# Patient Record
Sex: MALE | Race: BLACK OR AFRICAN AMERICAN | Employment: FULL TIME | URBAN - METROPOLITAN AREA
[De-identification: names, ages, dates, MRNs, and addresses within clinical notes are randomized per-mention and may not be internally consistent; named-entity substitution may affect disease eponyms.]

---

## 2021-05-28 ENCOUNTER — RECORDS - HEALTHEAST (OUTPATIENT)
Dept: ADMINISTRATIVE | Facility: CLINIC | Age: 61
End: 2021-05-28

## 2021-10-28 ENCOUNTER — OFFICE VISIT (OUTPATIENT)
Dept: FAMILY MEDICINE | Facility: CLINIC | Age: 61
End: 2021-10-28

## 2021-10-28 VITALS
DIASTOLIC BLOOD PRESSURE: 103 MMHG | OXYGEN SATURATION: 98 % | BODY MASS INDEX: 23.98 KG/M2 | RESPIRATION RATE: 18 BRPM | WEIGHT: 149.2 LBS | SYSTOLIC BLOOD PRESSURE: 148 MMHG | HEART RATE: 88 BPM | HEIGHT: 66 IN

## 2021-10-28 DIAGNOSIS — Z98.1 S/P SPINAL FUSION: ICD-10-CM

## 2021-10-28 DIAGNOSIS — K21.00 GASTROESOPHAGEAL REFLUX DISEASE WITH ESOPHAGITIS WITHOUT HEMORRHAGE: ICD-10-CM

## 2021-10-28 DIAGNOSIS — M54.16 LUMBAR RADICULOPATHY: Primary | ICD-10-CM

## 2021-10-28 DIAGNOSIS — Z12.11 SCREENING FOR COLON CANCER: ICD-10-CM

## 2021-10-28 PROCEDURE — 99204 OFFICE O/P NEW MOD 45 MIN: CPT | Performed by: FAMILY MEDICINE

## 2021-10-28 RX ORDER — AMITRIPTYLINE HYDROCHLORIDE 50 MG/1
50 TABLET ORAL AT BEDTIME
COMMUNITY
End: 2021-10-28

## 2021-10-28 RX ORDER — AMITRIPTYLINE HYDROCHLORIDE 50 MG/1
50 TABLET ORAL AT BEDTIME
Qty: 90 TABLET | Refills: 1 | Status: SHIPPED | OUTPATIENT
Start: 2021-10-28 | End: 2021-11-12

## 2021-10-28 RX ORDER — NICOTINE POLACRILEX 4 MG/1
20 GUM, CHEWING ORAL
Qty: 270 TABLET | Refills: 1 | Status: SHIPPED | OUTPATIENT
Start: 2021-10-28 | End: 2021-11-12

## 2021-10-28 ASSESSMENT — ANXIETY QUESTIONNAIRES
5. BEING SO RESTLESS THAT IT IS HARD TO SIT STILL: SEVERAL DAYS
7. FEELING AFRAID AS IF SOMETHING AWFUL MIGHT HAPPEN: NOT AT ALL
1. FEELING NERVOUS, ANXIOUS, OR ON EDGE: SEVERAL DAYS
2. NOT BEING ABLE TO STOP OR CONTROL WORRYING: MORE THAN HALF THE DAYS
6. BECOMING EASILY ANNOYED OR IRRITABLE: SEVERAL DAYS
3. WORRYING TOO MUCH ABOUT DIFFERENT THINGS: NEARLY EVERY DAY
GAD7 TOTAL SCORE: 10
IF YOU CHECKED OFF ANY PROBLEMS ON THIS QUESTIONNAIRE, HOW DIFFICULT HAVE THESE PROBLEMS MADE IT FOR YOU TO DO YOUR WORK, TAKE CARE OF THINGS AT HOME, OR GET ALONG WITH OTHER PEOPLE: SOMEWHAT DIFFICULT

## 2021-10-28 ASSESSMENT — PATIENT HEALTH QUESTIONNAIRE - PHQ9
5. POOR APPETITE OR OVEREATING: MORE THAN HALF THE DAYS
SUM OF ALL RESPONSES TO PHQ QUESTIONS 1-9: 12

## 2021-10-28 ASSESSMENT — MIFFLIN-ST. JEOR: SCORE: 1429.52
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
## 2021-10-29 ASSESSMENT — ANXIETY QUESTIONNAIRES: GAD7 TOTAL SCORE: 10

## 2021-11-12 DIAGNOSIS — M54.16 LUMBAR RADICULOPATHY: ICD-10-CM

## 2021-11-12 DIAGNOSIS — K21.00 GASTROESOPHAGEAL REFLUX DISEASE WITH ESOPHAGITIS WITHOUT HEMORRHAGE: ICD-10-CM

## 2021-11-12 DIAGNOSIS — Z98.1 S/P SPINAL FUSION: ICD-10-CM

## 2021-11-12 RX ORDER — NICOTINE POLACRILEX 4 MG/1
20 GUM, CHEWING ORAL
Qty: 270 TABLET | Refills: 1 | Status: SHIPPED | OUTPATIENT
Start: 2021-11-12 | End: 2022-09-12

## 2021-11-12 RX ORDER — AMITRIPTYLINE HYDROCHLORIDE 50 MG/1
50 TABLET ORAL AT BEDTIME
Qty: 90 TABLET | Refills: 1 | Status: SHIPPED | OUTPATIENT
Start: 2021-11-12 | End: 2022-04-10
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
## 2022-01-08 ENCOUNTER — APPOINTMENT (OUTPATIENT)
Dept: RADIOLOGY | Facility: CLINIC | Age: 62
End: 2022-01-08
Attending: EMERGENCY MEDICINE

## 2022-01-08 ENCOUNTER — HOSPITAL ENCOUNTER (EMERGENCY)
Facility: CLINIC | Age: 62
Discharge: HOME | End: 2022-01-08
Attending: EMERGENCY MEDICINE | Admitting: EMERGENCY MEDICINE
Payer: COMMERCIAL

## 2022-01-08 VITALS
WEIGHT: 142 LBS | HEART RATE: 70 BPM | BODY MASS INDEX: 22.92 KG/M2 | RESPIRATION RATE: 18 BRPM | OXYGEN SATURATION: 98 % | SYSTOLIC BLOOD PRESSURE: 186 MMHG | DIASTOLIC BLOOD PRESSURE: 107 MMHG | TEMPERATURE: 97.6 F

## 2022-01-08 DIAGNOSIS — S46.912A STRAIN OF LEFT SHOULDER, INITIAL ENCOUNTER: ICD-10-CM
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
## 2022-01-08 LAB
ATRIAL RATE - MUSE: 71 BPM
DIASTOLIC BLOOD PRESSURE - MUSE: NORMAL MMHG
INTERPRETATION ECG - MUSE: NORMAL
P AXIS - MUSE: 77 DEGREES
PR INTERVAL - MUSE: 130 MS
QRS DURATION - MUSE: 90 MS
QT - MUSE: 412 MS
QTC - MUSE: 447 MS
R AXIS - MUSE: 92 DEGREES
SYSTOLIC BLOOD PRESSURE - MUSE: NORMAL MMHG
T AXIS - MUSE: 68 DEGREES
VENTRICULAR RATE- MUSE: 71 BPM

## 2022-01-08 PROCEDURE — 73030 X-RAY EXAM OF SHOULDER: CPT | Mod: LT

## 2022-01-08 PROCEDURE — 99284 EMERGENCY DEPT VISIT MOD MDM: CPT

## 2022-01-08 PROCEDURE — 93005 ELECTROCARDIOGRAM TRACING: CPT | Performed by: EMERGENCY MEDICINE

## 2022-01-08 RX ORDER — CYCLOBENZAPRINE HCL 10 MG
10 TABLET ORAL 3 TIMES DAILY PRN
Qty: 20 TABLET | Refills: 0 | Status: SHIPPED | OUTPATIENT
Start: 2022-01-08 | End: 2022-01-14

## 2022-01-08 ASSESSMENT — ENCOUNTER SYMPTOMS: MYALGIAS: 1
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
## 2022-03-14 ENCOUNTER — PATIENT OUTREACH (OUTPATIENT)
Dept: CARE COORDINATION | Facility: CLINIC | Age: 62
End: 2022-03-14
Payer: COMMERCIAL

## 2022-03-14 NOTE — LETTER
[facility] CARE COORDINATION      [facility]   [address] [place]  [address]    March 15, 2022    Luis Meng  [address]  [place] [address]    Dear Luis,    We have been trying to reach you to introduce you to [facility] s Care Coordination program.  The goal of care coordination is to help you manage your health and improve access to the [facility] system in the most efficient manner.  The Care Coordinator is a /nurse who understands the healthcare system and will assist you in improving your access to care.     As your Physician and Care Coordinator we partner to help you achieve your health care goals.     We will continue to reach out; however, if you are able to call your Care Coordinator at [facility], [phone], that would be appreciated.  We at [facility] are focused on providing you with the highest-quality healthcare experience possible.      It is a pleasure to partner with you as we work towards achieving your optimal state of wellness.            Sincerely,        Latrell Lake, SW/CC, for [facility], SW/CC

## 2022-03-14 NOTE — PROGRESS NOTES
Clinic Care Coordination Contact - 3-15-22  [facility]/Voicemail    Clinical Data: SW/Care Coordinator Outreach  Outreach attempted x 2.    Plan: SW/Care Coordinator was unable to leave voice mail message as pt's voice mail was not yet set up.  SW/CC will send unable to contact letter with care coordinator contact information via mail. Care Coordinator will try to reach patient again in 3-5 business days.    DAVID Aponte/DOMINICK, for [facility], DAVID/CC  [phone]      Clinic Care Coordination Contact - 3-14-22  [facility]/Voicemail    Clinical Data: SW/Care Coordinator Outreach  Outreach attempted x 1.  SW/CC was unable to leave voice mail message for pt as his voice mail box was not set up.  Later this morning, SW/CC reached out to pt x2,  though he was on another line talking to his son from out of town and preferred to talk with SW/CC tomorrow morning.      Plan:  SW/CC will reach out to pt again tomorrow AM..    AKILAH Aponte, for DAVID Meng/CC  [phone]

## 2022-03-18 ENCOUNTER — PATIENT OUTREACH (OUTPATIENT)
Dept: NURSING | Facility: CLINIC | Age: 62
End: 2022-03-18
Payer: COMMERCIAL
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
## 2022-04-10 DIAGNOSIS — Z98.1 S/P SPINAL FUSION: ICD-10-CM

## 2022-04-10 DIAGNOSIS — M54.16 LUMBAR RADICULOPATHY: ICD-10-CM

## 2022-04-10 RX ORDER — AMITRIPTYLINE HYDROCHLORIDE 50 MG/1
TABLET ORAL
Qty: 90 TABLET | Refills: 1 | Status: SHIPPED | OUTPATIENT
Start: 2022-04-10 | End: 2022-09-05

## 2022-09-05 DIAGNOSIS — Z98.1 S/P SPINAL FUSION: ICD-10-CM

## 2022-09-05 DIAGNOSIS — M54.16 LUMBAR RADICULOPATHY: ICD-10-CM

## 2022-09-05 RX ORDER — AMITRIPTYLINE HYDROCHLORIDE 50 MG/1
TABLET ORAL
Qty: 90 TABLET | Refills: 1 | Status: SHIPPED | OUTPATIENT
Start: 2022-09-05 | End: 2023-06-26

## 2022-09-12 DIAGNOSIS — K21.00 GASTROESOPHAGEAL REFLUX DISEASE WITH ESOPHAGITIS WITHOUT HEMORRHAGE: ICD-10-CM

## 2022-09-12 RX ORDER — NICOTINE POLACRILEX 4 MG/1
20 GUM, CHEWING ORAL
Qty: 270 TABLET | Refills: 1 | Status: SHIPPED | OUTPATIENT
Start: 2022-09-12 | End: 2022-09-15

## 2022-09-12 NOTE — TELEPHONE ENCOUNTER
OMEPRAZOLE     LOV: 10/28/21    Gastroesophageal reflux disease with esophagitis without hemorrhage  -     omeprazole 20 MG tablet; Take 1 tablet (20 mg) by mouth 3 times daily Take 1 tablet before breakfast, 1 tablet before lunch, 1 tablet before dinnerTake 1 tablet before breakfast, 1 tablet before lunch, 1 tablet before dinner

## 2022-09-15 ENCOUNTER — TELEPHONE (OUTPATIENT)
Dept: FAMILY MEDICINE | Facility: CLINIC | Age: 62
End: 2022-09-15

## 2022-09-15 DIAGNOSIS — K21.00 GASTROESOPHAGEAL REFLUX DISEASE WITH ESOPHAGITIS WITHOUT HEMORRHAGE: Primary | ICD-10-CM

## 2022-09-15 NOTE — TELEPHONE ENCOUNTER
Received notice that omeprazole 20mg TID before meals requires prior authorization.   Tabs not covered as considered OTC; capsules preferred but need prior authorization.   Called patient to get details on 9/12/22 prior to sending rx. Patient reported had been off this med for many months and is miserable now with abd pain. Requested rx be sent asap.   Prior authorization request submitted today via Cover My Meds and was approved effective 1/1/22-12/31/22.     Last visit: 10/28/21 saw Dr. Zuniga to establish care at clinic  Advised patient due for appt.     Pharmacy informed prior authorization approved. New rx sent for omeprazole DR Capsules 20mg TID before meals #270 with no refills. Patient needs appt before next fill.   Lo Abdalla RN

## 2022-11-06 ENCOUNTER — HOSPITAL ENCOUNTER (EMERGENCY)
Facility: HOSPITAL | Age: 62
Discharge: HOME | End: 2022-11-06
Admitting: NURSE PRACTITIONER

## 2022-11-06 ENCOUNTER — APPOINTMENT (OUTPATIENT)
Dept: RADIOLOGY | Facility: HOSPITAL | Age: 62
End: 2022-11-06
Attending: NURSE PRACTITIONER

## 2022-11-06 ENCOUNTER — NURSE TRIAGE (OUTPATIENT)
Dept: NURSING | Facility: CLINIC | Age: 62
End: 2022-11-06

## 2022-11-06 VITALS
OXYGEN SATURATION: 98 % | HEART RATE: 100 BPM | RESPIRATION RATE: 16 BRPM | HEIGHT: 66 IN | SYSTOLIC BLOOD PRESSURE: 182 MMHG | WEIGHT: 137 LBS | TEMPERATURE: 98 F | BODY MASS INDEX: 22.02 KG/M2 | DIASTOLIC BLOOD PRESSURE: 93 MMHG

## 2022-11-06 DIAGNOSIS — M79.644 PAIN OF FINGER OF RIGHT HAND: ICD-10-CM

## 2022-11-06 PROCEDURE — 250N000013 HC RX MED GY IP 250 OP 250 PS 637: Performed by: NURSE PRACTITIONER

## 2022-11-06 PROCEDURE — 99283 EMERGENCY DEPT VISIT LOW MDM: CPT

## 2022-11-06 PROCEDURE — 73130 X-RAY EXAM OF HAND: CPT | Mod: RT

## 2022-11-06 RX ORDER — OXYCODONE AND ACETAMINOPHEN 5; 325 MG/1; MG/1
1 TABLET ORAL ONCE
Status: COMPLETED | OUTPATIENT
Start: 2022-11-06 | End: 2022-11-06

## 2022-11-06 RX ORDER — OXYCODONE HYDROCHLORIDE 5 MG/1
5 TABLET ORAL EVERY 6 HOURS PRN
Qty: 5 TABLET | Refills: 0 | Status: SHIPPED | OUTPATIENT
Start: 2022-11-06 | End: 2022-11-09

## 2022-11-06 RX ADMIN — OXYCODONE HYDROCHLORIDE AND ACETAMINOPHEN 1 TABLET: 5; 325 TABLET ORAL at 14:13

## 2022-11-06 ASSESSMENT — ENCOUNTER SYMPTOMS
NUMBNESS: 0
ARTHRALGIAS: 1
CHILLS: 0
WEAKNESS: 0
FEVER: 0
BACK PAIN: 1

## 2022-11-06 NOTE — ED PROVIDER NOTES
EMERGENCY DEPARTMENT ENCOUNTER      NAME: Luis Meng  AGE: 62 year old male  YOB: 1960  MRN: [id number]  EVALUATION DATE & TIME: No admission date for patient encounter.    PCP: Donnell Zuniga    ED PROVIDER: TOMAS Huynh CNP      Chief Complaint   Patient presents with     Hand Pain         FINAL IMPRESSION:  1. Pain of finger of right hand          ED COURSE & MEDICAL DECISION MAKIN:35 PM I met with the patient, obtained history, performed an initial exam, and discussed options and plan for treatment here in the ED.    Pertinent Labs & Imaging studies reviewed. (See chart for details)  62 year old male presents to the Emergency Department for evaluation of right hand pain. Pain on the tips of the 1-3 digits. Does not have swelling or erythema. No evidence for pulp space infection. No evidence for ischemic process. No underlying fracture or significant DJD. Suspect his pain may be related to repetitive trauma due to work. Does have a fissure on the tip of the thumb we discussed moisturizing. Should follow up in clinic in one week. Provided with instructions regarding pain management and was given a prescription for 5 oxycodone tablets since he did appear quite uncomfortable. Also given return precautions.     At the conclusion of the encounter I discussed the results of all of the tests and the disposition. The questions were answered. The patient or family acknowledged understanding and was agreeable with the care plan.       MEDICATIONS GIVEN IN THE EMERGENCY:  Medications   oxyCODONE-acetaminophen (PERCOCET) 5-325 MG per tablet 1 tablet (1 tablet Oral Given 22 1413)       NEW PRESCRIPTIONS STARTED AT TODAY'S ER VISIT  New Prescriptions    OXYCODONE (ROXICODONE) 5 MG TABLET    Take 1 tablet (5 mg) by mouth every 6 hours as needed for severe pain            =================================================================    HPI    Patient information was obtained  from: patient    Use of Intrepreter: N/A        Luis Meng is a 62 year old right handed male who  has a past medical history of Cervical spondylosis without myelopathy (4/1/2009), Erectile dysfunction (10/10/2008), and S/P spinal fusion (8/10/2013) and presents today for evaluation of right hand pain. Started about 1 week ago. Cannot recall any particular injury. Has pain involving the finger tips on the right 1-3 digits. Mild pain at rest. Worse with movement or palpation. Had noted some swelling and redness which has since resolved. No weakness or systemic symptoms. 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 States pain feels better when his hands are moist and when wearing gloves. Has been using ibuprofen.        REVIEW OF SYSTEMS   Review of Systems   Constitutional: Negative for chills and fever.   Musculoskeletal: Positive for arthralgias (see HPI) and back pain (chronic).   Neurological: Negative for weakness and numbness.   All other systems reviewed and are negative.      PAST MEDICAL HISTORY:  Past Medical History:   Diagnosis Date     Cervical spondylosis without myelopathy 4/1/2009     Erectile dysfunction 10/10/2008     S/P spinal fusion 8/10/2013       PAST SURGICAL HISTORY:  Past Surgical History:   Procedure Laterality Date     BACK SURGERY      4 surgeries on the lower spine to fuse           CURRENT MEDICATIONS:    No current facility-administered medications for this encounter.     Current Outpatient Medications   Medication     oxyCODONE (ROXICODONE) 5 MG tablet     amitriptyline (ELAVIL) 50 MG tablet     aspirin 81 MG tablet     Calcium 500-100 MG-UNIT CHEW     omeprazole (PRILOSEC) 20 MG DR capsule         ALLERGIES:  No Known Allergies    FAMILY HISTORY:  Family History   Problem Relation Age of Onset     Pancreatic Cancer Mother      Cancer Mother        SOCIAL HISTORY:   Social History     Socioeconomic History     Marital status:      Spouse name: None      "Number of children: None     Years of education: None     Highest education level: None   Tobacco Use     Smoking status: Every Day     Smokeless tobacco: Never         VITALS:  Patient Vitals for the past 24 hrs:   BP Temp Pulse Resp SpO2 Height Weight   11/06/22 1214 -- -- -- -- -- 1.676 m (5' 6\") 62.1 kg (137 lb)   11/06/22 1211 (!) 182/93 98  F (36.7  C) 100 16 98 % -- --       PHYSICAL EXAM    Constitutional:  Alert, no distress, does appear quite uncomfortable.  EYES: Conjunctivae clear  HENT:  Atraumatic, normocephalic  Respiratory:  No respiratory distress  Musculoskeletal:  Right hand: No edema.  No cyanosis. Fissure on the tip of the right thumb. No ulcerations. Capillary refill < 2 seconds. No erythema. Significant Tenderness with palpation of the tips of the right 1-3 digits. Sensation intact. All 3 layers of tendon function intact. No nail deformities. Right radial pulse 2+.  Integument: Warm, Dry, No erythema, No rash.   Neurologic:  Alert & oriented x 3              LAB:  All pertinent labs reviewed and interpreted.  Labs Ordered and Resulted from Time of ED Arrival to Time of ED Departure - No data to display      RADIOLOGY:  Reviewed all pertinent imaging. Please see official radiology report.  XR Hand Right G/E 3 Views   Final Result   IMPRESSION: No acute fracture or dislocation. Mild scattered degenerative changes.                PROCEDURES:   None      TOMAS Huynh, CNP  Emergency Medicine  [facility] EMERGENCY DEPARTMENT  [address] [place] [id number]  [phone]  Dept: [phone]         Shadi Fernandez APRN CNP  11/06/22 0447    "

## 2022-11-06 NOTE — LETTER
[facility] Emergency Department  [address] [place] [id number]  Phone: [phone]   November 6, 2022    Patient: Luis Meng   YOB: 1960   Date of Visit: 11/6/2022       To Whom It May Concern:    Luis Meng was present in our emergency department on 11/6/2022.    Please excuse from work from 11/1/2022 through 11/9/2022.    Sincerely,     [facility] Emergency Department

## 2022-11-06 NOTE — TELEPHONE ENCOUNTER
"Patient calling. 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 The next day the fingers on his right hand were sore. By Monday finger tips and cuticles are swelled and red on his right hand. Patient has been resting and taking OTC pain medication all week. Finger tips are \"hard as rocks\" due to the swelling at the cuticle and tips of fingers. If you touch or tap these fingers the pain is extreme. Patient reports difficulty using his right hand due to this pain with anything touching the finger tips.   Denies redness at this time, pus, open skin, fever.  Protocol recommends see physician within 4 hours.   Walk in clinic hours and locations give to patient. Patient asking to schedule an appointment tomorrow. Encouraged patient to be seen today if no appointments available tomorrow. Patient to call back with worsening symptoms.   Amy Norris RN   11/06/22 8:27 AM  [facility] Nurse Advisor      Reason for Disposition    [1] SEVERE pain (e.g., excruciating) AND [2] not improved after 2 hours of pain medicine    Additional Information    Negative: Followed a finger injury    Negative: Wound looks infected    Negative: Caused by an animal bite    Negative: Caused by frostbite    Negative: Caused by a human bite    Negative: Hand or wrist pain is main symptom    Negative: [1] Swollen joint AND [2] fever    Negative: [1] Looks infected (spreading redness, red streak, pus) AND [2] fever    Negative: [1] Looks infected (spreading redness, red streak, pus) AND [2] severe pain with movement    Negative: Patient sounds very sick or weak to the triager    Negative: [1] Looks infected (spreading redness, red streak, pus) AND [2] large red area (> 2 inches or 5 cm, or entire finger)    Protocols used: FINGER PAIN-A-AH      "

## 2022-11-06 NOTE — DISCHARGE INSTRUCTIONS
Your hand xray is negative.    I suspect that your finger pain is due to repetitive injuries at work.    You should take ibuprofen, 600mg, three times per day as needed for pain.  You can also use acetaminophen, 650 mg,up to four times per day as needed for pain.  You can use these medications together, there are no concerning interactions.  If this does not adequately control your pain, you can use 1 tabs of the prescribed oxycodone every6 hours as needed for uncontrolled pain.  If you use this medication, you should not drive or perform other activities that require full attention as this medication may make you drowsy. oxycodone like all opiate painmedications, is potentially habit forming and you should only use the minimum amount necessary to control your pain.      Keep your hands well moisturized.    A work note is provided    Follow up with your doctor within one week    Return to the ER if worsening.

## 2022-11-06 NOTE — ED TRIAGE NOTES
In by self. Pt states that three fingers on his right hand are hurting. Pt has no known injury to that hand. Pt states that he has not been able to work so he has had to just lay in bed and not do anything. Pt says that he has had to miss work from this so he needs a work note.      Triage Assessment     Row Name 11/06/22 1212       Triage Assessment (Adult)    Airway WDL WDL

## 2022-12-12 DIAGNOSIS — K21.00 GASTROESOPHAGEAL REFLUX DISEASE WITH ESOPHAGITIS WITHOUT HEMORRHAGE: ICD-10-CM

## 2023-02-06 ENCOUNTER — OFFICE VISIT (OUTPATIENT)
Dept: FAMILY MEDICINE | Facility: CLINIC | Age: 63
End: 2023-02-06

## 2023-02-06 VITALS
RESPIRATION RATE: 16 BRPM | OXYGEN SATURATION: 97 % | WEIGHT: 161 LBS | HEART RATE: 89 BPM | HEIGHT: 67 IN | DIASTOLIC BLOOD PRESSURE: 108 MMHG | BODY MASS INDEX: 25.27 KG/M2 | SYSTOLIC BLOOD PRESSURE: 161 MMHG

## 2023-02-06 DIAGNOSIS — Z98.1 S/P SPINAL FUSION: ICD-10-CM

## 2023-02-06 DIAGNOSIS — Z13.6 SCREENING FOR HEART DISEASE: ICD-10-CM

## 2023-02-06 DIAGNOSIS — J40 BRONCHITIS: Primary | ICD-10-CM

## 2023-02-06 DIAGNOSIS — Z11.4 SCREENING FOR HIV WITHOUT PRESENCE OF RISK FACTORS: ICD-10-CM

## 2023-02-06 DIAGNOSIS — Z23 NEED FOR VACCINATION: ICD-10-CM

## 2023-02-06 DIAGNOSIS — M54.16 LUMBAR RADICULOPATHY: ICD-10-CM

## 2023-02-06 DIAGNOSIS — Z11.59 ENCOUNTER FOR HCV SCREENING TEST FOR LOW RISK PATIENT: ICD-10-CM

## 2023-02-06 DIAGNOSIS — Z12.11 SCREENING FOR COLON CANCER: ICD-10-CM

## 2023-02-06 LAB
CHOL/HDL RATIO (RMG): 2.9 MG/DL (ref 0–4.5)
CHOLESTEROL: 162 MG/DL (ref 100–199)
HDL (RMG): 55 MG/DL (ref 40–?)
LDL CALCULATED (RMG): 97 MG/DL (ref 0–130)
TRIGLYCERIDES (RMG): 47 MG/DL (ref 0–149)

## 2023-02-06 PROCEDURE — 80061 LIPID PANEL: CPT | Mod: QW | Performed by: FAMILY MEDICINE

## 2023-02-06 PROCEDURE — 99214 OFFICE O/P EST MOD 30 MIN: CPT | Mod: 25 | Performed by: FAMILY MEDICINE

## 2023-02-06 PROCEDURE — 36415 COLL VENOUS BLD VENIPUNCTURE: CPT | Performed by: FAMILY MEDICINE

## 2023-02-06 RX ORDER — AZITHROMYCIN 250 MG/1
TABLET, FILM COATED ORAL
Qty: 6 TABLET | Refills: 0 | Status: SHIPPED | OUTPATIENT
Start: 2023-02-06
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
## 2023-02-08 LAB
HCV AB SERPL QL IA: <0.1 S/CO RATIO (ref 0–0.9)
HIV SCREEN 4TH GEN WITH RFLX: NON REACTIVE

## 2023-02-08 NOTE — RESULT ENCOUNTER NOTE
Dear Luis,     I am writing to report that your included test results are as expected.    Many labs contain some results that are slightly outside of the normal range, I have reviewed any of these results and they require no changes at this time.    Donnell Zuniga MD

## 2023-02-27 DIAGNOSIS — K21.00 GASTROESOPHAGEAL REFLUX DISEASE WITH ESOPHAGITIS WITHOUT HEMORRHAGE: ICD-10-CM

## 2023-02-27 PROCEDURE — 90715 TDAP VACCINE 7 YRS/> IM: CPT | Performed by: FAMILY MEDICINE

## 2023-02-27 PROCEDURE — 90471 IMMUNIZATION ADMIN: CPT | Mod: 59 | Performed by: FAMILY MEDICINE

## 2023-04-07 RX ORDER — FLUTICASONE PROPIONATE 50 MCG
SPRAY, SUSPENSION (ML) NASAL
COMMUNITY
Start: 2022-12-12

## 2023-06-26 DIAGNOSIS — M54.16 LUMBAR RADICULOPATHY: ICD-10-CM

## 2023-06-26 DIAGNOSIS — Z98.1 S/P SPINAL FUSION: ICD-10-CM

## 2023-06-26 RX ORDER — AMITRIPTYLINE HYDROCHLORIDE 50 MG/1
TABLET ORAL
Qty: 90 TABLET | Refills: 1 | Status: SHIPPED | OUTPATIENT
Start: 2023-06-26